# Patient Record
Sex: FEMALE | Race: OTHER | HISPANIC OR LATINO | URBAN - METROPOLITAN AREA
[De-identification: names, ages, dates, MRNs, and addresses within clinical notes are randomized per-mention and may not be internally consistent; named-entity substitution may affect disease eponyms.]

---

## 2017-01-20 NOTE — PATIENT DISCUSSION
Presbyopia Counseling: The diagnosis of presbyopia was explained to the patient. Options for the correction of the patient's presbyopia which may include glasses, contacts or elective refractive surgery were discussed. Return for follow-up as scheduled.

## 2017-01-20 NOTE — PATIENT DISCUSSION
SEVERE DRY EYES:  INCREASE FREQUENCY OF DISAPPEARING PRESERVATIVE OR PRESERVATIVE FREE ARTIFICIAL TEAR USE TO 4 - 6 TIMES PER DAY. RX GIVEN FOR XIIDRA BID, OU. CONTINUE OMEGA-3 FATTY ACID AND ADD WARM COMPRESSES AND EYELID SCRUBS DAILY. WILL RECOMMEND CONSIDER PUNCTAL PLUGS AND/OR LIPIFLOW TREATMENT IF NOT RESPONSIVE OR IF SYMPTOMS PERSIST. RETURN FOR FOLLOW UP AS SCHEDULED OR SOONER IF SYMPTOMS WORSEN.

## 2017-01-20 NOTE — PATIENT DISCUSSION
New Prescription: erythromycin (erythromycin): ointment: 5 mg/gram (0.5 %) a small amount at bedtime as directed on eyelid 01-

## 2017-02-21 NOTE — PATIENT DISCUSSION
Continue: erythromycin (erythromycin): ointment: 5 mg/gram (0.5 %) a small amount at bedtime as directed on eyelid 01-

## 2017-02-21 NOTE — PATIENT DISCUSSION
MODERATE DRY EYES: PRESCRIBED DISAPPEARING OTC PRESERVATIVE OR OTC PRESERVATIVE FREE ARTIFICIAL TEARS BID &ndash; QID4-6X A DAY, OU AND THE DAILY INTAKE OF OMEGA-3 DHA/EPA FATTY ACIDS TO HELP RELIEVE SYMPTOMS. ADD NIGHTLY LUBRICATING OINTMENT OR GEL. Aldean Pod. AND WILL CONSIDER PUNCTAL PLUGS AND/OR LIPIFLOW TREATMENT NEXT VISIT IF NOT RESPONSIVE OR IF SYMPTOMS PERSIST. RETURN FOR FOLLOW-UP AS SCHEDULED OR SOONER IF SYMPTOMS WORSEN.

## 2017-05-19 NOTE — PATIENT DISCUSSION
MODERATE DRY EYES: PRESCRIBED DISAPPEARING OTC PRESERVATIVE OR OTC PRESERVATIVE FREE ARTIFICIAL TEARS BID &ndash; QID4-6X A DAY, OU AND THE DAILY INTAKE OF OMEGA-3 DHA/EPA FATTY ACIDS TO HELP RELIEVE SYMPTOMS. ADD NIGHTLY LUBRICATING OINTMENT OR GEL. CONTINUE RESTASIS AND WILL CONSIDER PUNCTAL PLUGS AND/OR LIPIFLOW TREATMENT NEXT VISIT IF NOT RESPONSIVE OR IF SYMPTOMS PERSIST. RETURN FOR FOLLOW-UP AS SCHEDULED OR SOONER IF SYMPTOMS WORSEN.

## 2017-05-19 NOTE — PATIENT DISCUSSION
VISUAL DISTURBANCE. PT EDUCATION. MONITOR. REFER TO DR. KERRISON FOR FURTHER EVALUATION. PT IS EXPERIENCING PERCEPTION OF BLUR IN OD.  BCVA IS 20/20. PT STATES THIS IS NEW ONSET.

## 2019-04-17 NOTE — PATIENT DISCUSSION
MODERATE DRY EYES: PRESCRIBED DISAPPEARING OTC PRESERVATIVE OR OTC PRESERVATIVE FREE ARTIFICIAL TEARS BID ? QID4-6X A DAY, OU AND THE DAILY INTAKE OF OMEGA-3 DHA/EPA FATTY ACIDS TO HELP RELIEVE SYMPTOMS. ADD NIGHTLY LUBRICATING OINTMENT OR GEL. Rosalie Maudlin. AND WILL CONSIDER PUNCTAL PLUGS AND/OR LIPIFLOW TREATMENT NEXT VISIT IF NOT RESPONSIVE OR IF SYMPTOMS PERSIST. RETURN FOR FOLLOW-UP AS SCHEDULED OR SOONER IF SYMPTOMS WORSEN.

## 2021-05-24 NOTE — PATIENT DISCUSSION
MODERATE DRY EYES: PRESCRIBED DISAPPEARING OTC PRESERVATIVE OR ARTIFICIAL TEARS BID-QID, OU AND THE DAILY INTAKE OF OMEGA-3 DHA/EPA FATTY ACIDS TO HELP RELIEVE SYMPTOMS. ADD NIGHTLY LUBRICATING OINTMENT OR GEL. CONTINUE XIIDRA BID OU. WILL CONSIDER PUNCTAL PLUGS AND/OR LIPIFLOW TREATMENT NEXT VISIT IF NOT RESPONSIVE OR IF SYMPTOMS PERSIST. RETURN FOR FOLLOW-UP AS SCHEDULED OR SOONER IF SYMPTOMS WORSEN. (4) no limitation

## 2022-07-05 ENCOUNTER — NEW PATIENT (OUTPATIENT)
Dept: URBAN - METROPOLITAN AREA CLINIC 34 | Facility: CLINIC | Age: 42
End: 2022-07-05

## 2022-07-05 DIAGNOSIS — H04.123: ICD-10-CM

## 2022-07-05 DIAGNOSIS — G43.909: ICD-10-CM

## 2022-07-05 DIAGNOSIS — H43.393: ICD-10-CM

## 2022-07-05 PROCEDURE — 92015 DETERMINE REFRACTIVE STATE: CPT

## 2022-07-05 PROCEDURE — 92004 COMPRE OPH EXAM NEW PT 1/>: CPT

## 2022-07-05 ASSESSMENT — TONOMETRY
OD_IOP_MMHG: 15
OS_IOP_MMHG: 15

## 2022-07-05 ASSESSMENT — VISUAL ACUITY
OS_SC: 20/20-1
OD_SC: 20/40
OD_SC: 20/25+2
OS_SC: 20/25+3